# Patient Record
Sex: MALE | Race: OTHER | HISPANIC OR LATINO | Employment: FULL TIME | URBAN - METROPOLITAN AREA
[De-identification: names, ages, dates, MRNs, and addresses within clinical notes are randomized per-mention and may not be internally consistent; named-entity substitution may affect disease eponyms.]

---

## 2023-12-06 ENCOUNTER — OFFICE VISIT (OUTPATIENT)
Dept: UROLOGY | Facility: CLINIC | Age: 33
End: 2023-12-06

## 2023-12-06 VITALS
BODY MASS INDEX: 29.96 KG/M2 | DIASTOLIC BLOOD PRESSURE: 84 MMHG | WEIGHT: 246 LBS | HEIGHT: 76 IN | TEMPERATURE: 98 F | OXYGEN SATURATION: 95 % | SYSTOLIC BLOOD PRESSURE: 130 MMHG | HEART RATE: 105 BPM

## 2023-12-06 DIAGNOSIS — N26.1 ATROPHIC KIDNEY: Primary | ICD-10-CM

## 2023-12-06 PROCEDURE — 99214 OFFICE O/P EST MOD 30 MIN: CPT | Mod: S$GLB,,, | Performed by: FAMILY MEDICINE

## 2023-12-06 PROCEDURE — 99214 PR OFFICE/OUTPT VISIT, EST, LEVL IV, 30-39 MIN: ICD-10-PCS | Mod: S$GLB,,, | Performed by: FAMILY MEDICINE

## 2023-12-06 RX ORDER — TRAMADOL HYDROCHLORIDE 50 MG/1
50 TABLET ORAL EVERY 6 HOURS PRN
Qty: 20 TABLET | Refills: 0 | Status: SHIPPED | OUTPATIENT
Start: 2023-12-06

## 2023-12-06 NOTE — PROGRESS NOTES
Subjective:       Patient ID: Ivanna Ashton is a 33 y.o. male.    Chief Complaint: Flank Pain      HPI: 33-year-old male patient presenting to the clinic to establish care.  Patient began experiencing right flank pain at the beginning of November.  He presented to the ER.  CT done noting in atrophic right kidney with fullness in the right renal pelvis with no definite right-sided hydronephrosis or right renal/ureteral calculus.  Low-grade right UPJ obstruction can not be completely excluded.  Patient states that his pain has been intensifying lately.  He has been to the emergency room 2 times for the same issue with no improvement.  He is here for further evaluation and management    Flank Pain  This is a new problem. The current episode started 1 to 4 weeks ago. The problem occurs constantly. The problem has been waxing and waning since onset. The quality of the pain is described as stabbing. The pain is at a severity of 7/10. The pain is severe. The symptoms are aggravated by twisting and coughing. He has tried analgesics for the symptoms. The treatment provided no relief.        Past Medical History: No past medical history on file.    Past Surgical Historical: No past surgical history on file.     Medications:      Past Social History:   Social History     Socioeconomic History    Marital status: Single   Tobacco Use    Smoking status: Every Day     Current packs/day: 1.00     Types: Cigarettes   Substance and Sexual Activity    Alcohol use: Never    Drug use: Never    Sexual activity: Yes       Allergies: Review of patient's allergies indicates:  No Known Allergies     Family History:   Family History   Problem Relation Age of Onset    Mental illness Mother         Review of Systems:  Review of Systems   Constitutional: Negative.    HENT: Negative.     Eyes: Negative.    Respiratory: Negative.     Cardiovascular: Negative.    Gastrointestinal: Negative.    Endocrine: Negative.    Genitourinary:  Positive for  flank pain.   Skin: Negative.    Allergic/Immunologic: Negative.    Neurological: Negative.    Hematological: Negative.    Psychiatric/Behavioral: Negative.         Physical Exam:  Physical Exam  Constitutional:       Appearance: He is normal weight.   HENT:      Head: Normocephalic.      Nose: Nose normal.      Mouth/Throat:      Mouth: Mucous membranes are moist.      Pharynx: Oropharynx is clear.   Eyes:      Extraocular Movements: Extraocular movements intact.      Conjunctiva/sclera: Conjunctivae normal.      Pupils: Pupils are equal, round, and reactive to light.   Cardiovascular:      Rate and Rhythm: Normal rate and regular rhythm.      Pulses: Normal pulses.      Heart sounds: Normal heart sounds.   Pulmonary:      Effort: Pulmonary effort is normal.      Breath sounds: Normal breath sounds.   Abdominal:      General: Abdomen is flat. Bowel sounds are normal.      Palpations: Abdomen is soft.      Hernia: There is no hernia in the right inguinal area or left inguinal area.   Genitourinary:     Penis: Normal. No phimosis, paraphimosis, hypospadias, erythema, tenderness or discharge.       Testes: Normal.         Right: Mass, tenderness or swelling not present. Right testis is descended. Cremasteric reflex is present.          Left: Mass, tenderness or swelling not present. Left testis is descended. Cremasteric reflex is present.       Prostate: Normal.      Rectum: Normal.   Musculoskeletal:         General: Normal range of motion.      Cervical back: Normal range of motion and neck supple.   Lymphadenopathy:      Lower Body: No right inguinal adenopathy. No left inguinal adenopathy.   Skin:     General: Skin is warm and dry.      Capillary Refill: Capillary refill takes less than 2 seconds.   Neurological:      General: No focal deficit present.      Mental Status: He is alert and oriented to person, place, and time. Mental status is at baseline.   Psychiatric:         Mood and Affect: Mood normal.          Behavior: Behavior normal.         Thought Content: Thought content normal.         Judgment: Judgment normal.         Assessment/Plan:     Atrophic right kidney with possible right UPJ obstruction: We will obtain renal scan to evaluate % function of the kidneys.  We would a thorough discussion about treatment options based on results of that scan.  When results are received we will have the patient follow up with Dr. Lozada to determine next step in the plan of care.  Pain control provided to patient.  Patient excused from work until Monday.  Problem List Items Addressed This Visit    None